# Patient Record
Sex: FEMALE | Race: WHITE | NOT HISPANIC OR LATINO | Employment: OTHER | URBAN - METROPOLITAN AREA
[De-identification: names, ages, dates, MRNs, and addresses within clinical notes are randomized per-mention and may not be internally consistent; named-entity substitution may affect disease eponyms.]

---

## 2019-08-07 PROBLEM — G93.41 ENCEPHALOPATHY, METABOLIC: Status: ACTIVE | Noted: 2019-08-07

## 2019-08-07 PROBLEM — G91.9 HYDROCEPHALUS: Status: ACTIVE | Noted: 2019-08-07

## 2019-08-07 PROBLEM — H53.132 ACUTE LOSS OF VISION, LEFT: Status: ACTIVE | Noted: 2019-08-07

## 2019-08-07 PROBLEM — F17.200 TOBACCO DEPENDENCE: Status: ACTIVE | Noted: 2019-08-07

## 2019-08-08 PROBLEM — Z98.2 VP (VENTRICULOPERITONEAL) SHUNT STATUS: Status: ACTIVE | Noted: 2019-08-08

## 2019-08-08 PROBLEM — G91.2 NPH (NORMAL PRESSURE HYDROCEPHALUS): Status: ACTIVE | Noted: 2019-08-07

## 2020-05-13 PROBLEM — R26.9 NEUROLOGIC GAIT DYSFUNCTION: Status: ACTIVE | Noted: 2020-05-13

## 2020-05-13 PROBLEM — Z98.2 S/P VP SHUNT: Status: ACTIVE | Noted: 2020-05-13

## 2020-05-14 ENCOUNTER — TELEPHONE (OUTPATIENT)
Dept: NEUROSURGERY | Facility: CLINIC | Age: 55
End: 2020-05-14

## 2020-05-14 DIAGNOSIS — Z98.2 S/P VP SHUNT: Primary | ICD-10-CM

## 2020-06-16 ENCOUNTER — TELEPHONE (OUTPATIENT)
Dept: NEUROSURGERY | Facility: CLINIC | Age: 55
End: 2020-06-16

## 2020-06-16 NOTE — TELEPHONE ENCOUNTER
----- Message from Cha Howell NP sent at 6/15/2020 10:26 AM CDT -----  I am supposed to have called this patient with lab results. I have checked several times and she has not had them drawn yet. Can we reach out and see if she plans on doing this? TY.   ----- Message -----  From: Anisha Liao RN  Sent: 5/14/2020   4:07 PM CDT  To: Cha Howell NP    Check ESR, CRP labs and call pt to check on and see how she is doing.

## 2022-07-25 ENCOUNTER — TELEPHONE (OUTPATIENT)
Dept: NEUROSURGERY | Facility: CLINIC | Age: 57
End: 2022-07-25
Payer: MEDICAID

## 2022-07-25 NOTE — TELEPHONE ENCOUNTER
Contacted number provided. Lvm requesting a call back.       ----- Message from Kathrine Santa sent at 7/25/2022  2:10 PM CDT -----  Contact: [phone]  PT, would like to Scheduled a  Appointment, please contact @ number provided     Call /Daughter in law Helen [phone]    Pls  Call Helen for the appt status

## 2022-08-03 ENCOUNTER — TELEPHONE (OUTPATIENT)
Dept: NEUROSURGERY | Facility: CLINIC | Age: 57
End: 2022-08-03
Payer: MEDICAID

## 2022-08-03 NOTE — TELEPHONE ENCOUNTER
Returned call to Helen and received voicemail .   Advised this patient was seen Cha Howell and please contact at convenience.   Encouraged to call back should she require assistance moving forward.           ----- Message from Deana Camejo sent at 8/3/2022  2:52 PM CDT -----  Regarding: Appointment  Contact: Helen [phone]  Calling to get an appointment as soon as possible due possible hardware recall and malfunction. Patient is tasting fluids and has been trying to get an appointment since last week.